# Patient Record
Sex: FEMALE | Employment: FULL TIME | URBAN - METROPOLITAN AREA
[De-identification: names, ages, dates, MRNs, and addresses within clinical notes are randomized per-mention and may not be internally consistent; named-entity substitution may affect disease eponyms.]

---

## 2017-01-19 ENCOUNTER — OFFICE VISIT (OUTPATIENT)
Dept: BEHAVIORAL HEALTH | Facility: PHYSICIAN GROUP | Age: 32
End: 2017-01-19
Payer: COMMERCIAL

## 2017-01-19 VITALS
BODY MASS INDEX: 35.36 KG/M2 | SYSTOLIC BLOOD PRESSURE: 118 MMHG | WEIGHT: 220 LBS | HEIGHT: 66 IN | HEART RATE: 76 BPM | DIASTOLIC BLOOD PRESSURE: 70 MMHG | RESPIRATION RATE: 16 BRPM | TEMPERATURE: 97.7 F

## 2017-01-19 DIAGNOSIS — F40.10 SOCIAL ANXIETY DISORDER: ICD-10-CM

## 2017-01-19 DIAGNOSIS — F33.0 MILD EPISODE OF RECURRENT MAJOR DEPRESSIVE DISORDER (HCC): ICD-10-CM

## 2017-01-19 PROCEDURE — 90833 PSYTX W PT W E/M 30 MIN: CPT | Performed by: PSYCHIATRY & NEUROLOGY

## 2017-01-19 PROCEDURE — 99213 OFFICE O/P EST LOW 20 MIN: CPT | Performed by: PSYCHIATRY & NEUROLOGY

## 2017-01-19 RX ORDER — BUSPIRONE HYDROCHLORIDE 10 MG/1
10 TABLET ORAL 2 TIMES DAILY
Qty: 60 TAB | Refills: 5 | Status: SHIPPED | OUTPATIENT
Start: 2017-01-19 | End: 2018-03-17

## 2017-01-19 RX ORDER — BUPROPION HYDROCHLORIDE 300 MG/1
300 TABLET ORAL EVERY MORNING
Qty: 30 TAB | Refills: 5 | Status: SHIPPED | OUTPATIENT
Start: 2017-01-19 | End: 2018-03-17

## 2017-01-19 NOTE — PROGRESS NOTES
"[facility]  PSYCHIATRIC FOLLOW-UP NOTE    Name: Fela Stokes  MRN: [id number]  : 1985  Age: 31 y.o.  Date of assessment: 2017  PCP: DWIGHT Mata  Persons in attendance: Patient    REASON FOR VISIT/CHIEF COMPLAINT (as stated by Patient):  Fela Stokes is a 31 y.o., Unknown female, attending follow-up appointment for   Chief Complaint   Patient presents with   • Anxiety     The patient is seen today for depression and anxiety. The patient is anxious and not sleeping well.   .      HISTORY OF PRESENT ILLNESS:    This is a 30 yo female, mother of three children, employed, full time student, history of depression, anxiety, and fear of darkness. The patient is on bupropion  mg in AM and buspar 5 mg twice a day. The patient reported that she is anxious all the time and has been taking xanax 0.25 mg and at night for sleep.The patient is getting her xanax from PCP.     PSYCHOSOCIAL CHANGES SINCE PREVIOUS CONTACT:  Anxious and insomnia.    RESPONSE TO TREATMENT:  fair    MEDICATION SIDE EFFECTS:  Denied.    REVIEW OF SYSTEMS:        Constitutional positive - obesity   Eyes negative   Ears/Nose/Mouth/Throat negative   Cardiovascular negative   Respiratory negative   Gastrointestinal negative   Genitourinary negative   Muscular negative   Integumentary negative   Neurological negative   Endocrine positive - adrenal disorder.   Hematologic/Lymphatic positive - clotting disorder       PSYCHIATRIC EXAMINATION/MENTAL STATUS  /70 mmHg  Pulse 76  Temp(Src) 36.5 °C (97.7 °F)  Resp 16  Ht 1.676 m (5' 5.98\")  Wt 99.791 kg (220 lb)  BMI 35.53 kg/m2  Participation: Active verbal participation, Attentive and Engaged  Grooming:Casual  Orientation: Alert and Fully Oriented  Eye contact: Good  Behavior:Calm   Mood: Anxious  Affect: Flexible and Full range  Thought process: Logical and Goal-directed  Thought content:  Within normal limits  Speech: Rate within normal " limits  Perception:  Within normal limits  Memory:  No gross evidence of memory deficits  Insight: Good  Judgment: Good  Family/couple interaction observations:   Other:    Current risk:    Suicide: Low   Homicide: Low   Self-harm: Low  Relapse: Low  Other:   Crisis Safety Plan reviewed?Yes  If evidence of imminent risk is present, intervention/plan:    Medical Records/Labs/Diagnostic Tests Reviewed: yes.    Medical Records/Labs/Diagnostic Tests Ordered: none.    DIAGNOSTIC IMPRESSION(S):  1. Mild episode of recurrent major depressive disorder (CMS-HCC)    2. Social anxiety disorder           ASSESSMENT AND PLAN:    Major depression improved  Anxiety and insomnia    Increase Buspirone 10 mg twice a day # 60 Refills five  Bupropion  mg one AM # 30 refills five  Use xanax as needed and use OTC benafryl or melatonin for sleep.  Follow up in two months.      More than 50% of face-to-face time in this 30 minute visit was spent in psychotherapy/counseling.    Topics addressed include:    The patient is finishing her school in few months and she is working full time and takes care of her children.  Use good sleep hygiene  Meditation and other relaxation method  Avoid stimulant.    Keagan Diaz M.D.

## 2017-03-03 ENCOUNTER — TELEPHONE (OUTPATIENT)
Dept: MEDICAL GROUP | Facility: PHYSICIAN GROUP | Age: 32
End: 2017-03-03

## 2017-03-03 DIAGNOSIS — J02.0 STREP PHARYNGITIS: ICD-10-CM

## 2017-03-03 RX ORDER — AMOXICILLIN 875 MG/1
875 TABLET, COATED ORAL 2 TIMES DAILY
Qty: 20 TAB | Refills: 0 | Status: SHIPPED | OUTPATIENT
Start: 2017-03-03 | End: 2018-03-17

## 2017-03-03 NOTE — TELEPHONE ENCOUNTER
1. Caller Name: Fela Stokes                                           Call Back Number: [phone] (home)         Patient approves a detailed voicemail message: N\A    Pt states her son,Misha, was seen at  and tested positive for strep.  She is in [place] and has had a sore throat x 4 days.  Today she can barely talk.  She is asking if she can get anything called in to pharmacy in [place].  Thank you

## 2017-03-07 ENCOUNTER — OFFICE VISIT (OUTPATIENT)
Dept: URGENT CARE | Facility: PHYSICIAN GROUP | Age: 32
End: 2017-03-07
Payer: COMMERCIAL

## 2017-03-07 VITALS
SYSTOLIC BLOOD PRESSURE: 128 MMHG | WEIGHT: 209.6 LBS | DIASTOLIC BLOOD PRESSURE: 82 MMHG | TEMPERATURE: 98.5 F | OXYGEN SATURATION: 98 % | RESPIRATION RATE: 16 BRPM | BODY MASS INDEX: 33.85 KG/M2 | HEART RATE: 89 BPM

## 2017-03-07 DIAGNOSIS — J02.9 PHARYNGITIS, UNSPECIFIED ETIOLOGY: ICD-10-CM

## 2017-03-07 DIAGNOSIS — J04.0 LARYNGITIS: ICD-10-CM

## 2017-03-07 DIAGNOSIS — R05.9 COUGH: ICD-10-CM

## 2017-03-07 DIAGNOSIS — J06.9 ACUTE URI: ICD-10-CM

## 2017-03-07 LAB
INT CON NEG: NEGATIVE
INT CON POS: POSITIVE
S PYO AG THROAT QL: NORMAL

## 2017-03-07 PROCEDURE — 99214 OFFICE O/P EST MOD 30 MIN: CPT | Performed by: FAMILY MEDICINE

## 2017-03-07 PROCEDURE — 87880 STREP A ASSAY W/OPTIC: CPT | Performed by: FAMILY MEDICINE

## 2017-03-07 RX ORDER — DIPHENHYDRAMINE HCL 50 MG
50 CAPSULE ORAL EVERY 6 HOURS PRN
COMMUNITY
End: 2018-03-17

## 2017-03-07 RX ORDER — DIPHENHYDRAMINE HYDROCHLORIDE AND LIDOCAINE HYDROCHLORIDE AND ALUMINUM HYDROXIDE AND MAGNESIUM HYDRO
KIT
Qty: 240 ML | Refills: 0 | Status: SHIPPED | OUTPATIENT
Start: 2017-03-07 | End: 2018-03-17

## 2017-03-07 ASSESSMENT — ENCOUNTER SYMPTOMS
HEADACHES: 1
SENSORY CHANGE: 0
MYALGIAS: 1
EYE REDNESS: 0
EYE DISCHARGE: 0
FOCAL WEAKNESS: 0
WEIGHT LOSS: 0

## 2017-03-07 NOTE — Clinical Note
March 7, 2017         Patient: Fela Stokes   YOB: 1985   Date of Visit: 3/7/2017           To Whom it May Concern:    Fela Stokes was seen in my clinic on 3/7/2017.  Please excuse 3/7 through 3/9/2017.       Sincerely,           Corbin Christian M.D.  Electronically Signed

## 2017-03-07 NOTE — Clinical Note
March 7, 2017         Patient: Fela Stokes   YOB: 1985   Date of Visit: 3/7/2017           To Whom it May Concern:    Fela Stokes was seen in my clinic on 3/7/2017. Please excuse today.       Sincerely,           Corbin Christian M.D.  Electronically Signed

## 2017-03-07 NOTE — MR AVS SNAPSHOT
Fela Stokes   3/7/2017 3:30 PM   Office Visit   MRN: [id number]    Department:  [facility]   Dept Phone:  [phone]    Description:  Female : 1985   Provider:  Corbin Christian M.D.           Reason for Visit     Pharyngitis sore throat, throat feels like its on fire, x 3 days      Allergies as of 3/7/2017     Allergen Noted Reactions    Morphine Sulfate 2012   Hives, Itching, Swelling      You were diagnosed with     Acute URI   [668334]       Pharyngitis, unspecified etiology   [0920836]       Laryngitis   [539965]       Cough   [786.2.ICD-9-CM]         Vital Signs     Blood Pressure Pulse Temperature Respirations Weight Oxygen Saturation    128/82 mmHg 89 36.9 °C (98.5 °F) 16 95.074 kg (209 lb 9.6 oz) 98%    Smoking Status                   Never Smoker            Basic Information     Date Of Birth Sex Race Ethnicity Preferred Language    1985 Female Unknown Unknown English      Your appointments     Mar 20, 2017  9:00 AM   Follow Up Med Management with Keagan Diaz M.D.   [facility] [address] [place] [address])    [address] [place] [address]  [address] [place] [id number]   [phone]              Problem List              ICD-10-CM Priority Class Noted - Resolved    Depression F32.9   2014 - Present    Anxiety F41.9   2014 - Present      Health Maintenance        Date Due Completion Dates    PAP SMEAR 2018, 2012 (Prv Comp)    Override on 2012: Previously completed    IMM DTaP/Tdap/Td Vaccine (2 - Td) 3/24/2022 3/24/2012            Current Immunizations     Hepatitis B Vaccine Recombivax (Adol/Adult) 2015    Influenza TIV (IM) 10/6/2014    Influenza Vaccine Quad Inj (Preserved) 10/17/2016, 10/13/2015    MMR Vaccine  Incomplete    Tdap Vaccine 3/24/2012  3:00 PM,  Incomplete    Tuberculin Skin Test 2016, 2016, 2015, 2014, 2013 10:23 AM      Below and/or attached are the medications your provider expects you to take.  Review all of your home medications and newly ordered medications with your provider and/or pharmacist. Follow medication instructions as directed by your provider and/or pharmacist. Please keep your medication list with you and share with your provider. Update the information when medications are discontinued, doses are changed, or new medications (including over-the-counter products) are added; and carry medication information at all times in the event of emergency situations     Allergies:  MORPHINE SULFATE - Hives,Itching,Swelling               Medications  Valid as of: March 07, 2017 -  3:50 PM    Generic Name Brand Name Tablet Size Instructions for use    ALPRAZolam (Tab) XANAX 0.25 MG Take 1 Tab by mouth 2 times a day as needed for Sleep or Anxiety.        Amoxicillin (Tab) AMOXIL 875 MG Take 1 Tab by mouth 2 times a day.        BuPROPion HCl   Take  by mouth.        BuPROPion HCl (TABLET SR 24 HR) WELLBUTRIN  MG Take 1 Tab by mouth every morning.        BuPROPion HCl (TABLET SR 24 HR) WELLBUTRIN  MG Take 1 Tab by mouth every morning.        BusPIRone HCl   Take  by mouth.        BusPIRone HCl (Tab) BUSPAR 5 MG Take 1 Tab by mouth 2 Times a Day.        BusPIRone HCl (Tab) BUSPAR 10 MG Take 1 Tab by mouth 2 times a day.        DiphenhydrAMINE HCl (Cap) BENADRYL 50 MG Take 50 mg by mouth every 6 hours as needed.        DPH-Lido-AlHydr-MgHydr-Simeth (Suspension) MAGIC MOUTHWASH BLM  15-30ml gargle and spit every 3 hours as needed        Hydrocod Polst-Chlorphen Polst (Suspension Extended Release) TUSSIONEX 10-8 MG/5ML Take 5 mL by mouth every 12 hours.        Vilazodone HCl (Tab) Vilazodone HCl 20 MG Take 20 mg by mouth every day.        .                 Medicines prescribed today were sent to:     [id number] - [place] - [address] AT [facility] [address]    [address] [place] [place] [address]    Phone: [phone] Fax: [phone]    Open 24 Hours?: No     [facility] [place] [facility] [address]    [address] [facility] [address]    Phone: [phone] Fax: [phone]    Open 24 Hours?: No      Medication refill instructions:       If your prescription bottle indicates you have medication refills left, it is not necessary to call your provider’s office. Please contact your pharmacy and they will refill your medication.    If your prescription bottle indicates you do not have any refills left, you may request refills at any time through one of the following ways: The online [employer] system (except Urgent Care), by calling your provider’s office, or by asking your pharmacy to contact your provider’s office with a refill request. Medication refills are processed only during regular business hours and may not be available until the next business day. Your provider may request additional information or to have a follow-up visit with you prior to refilling your medication.   *Please Note: Medication refills are assigned a new Rx number when refilled electronically. Your pharmacy may indicate that no refills were authorized even though a new prescription for the same medication is available at the pharmacy. Please request the medicine by name with the pharmacy before contacting your provider for a refill.           Collective IP Access Code: Activation code not generated  Current Collective IP Status: Active

## 2017-03-07 NOTE — PROGRESS NOTES
Subjective:      Fela Stokes is a 31 y.o. female who presents with Pharyngitis            Pharyngitis   This is a new problem. Episode onset: 3 days, ST, hoarse voice, rhinorrhea. Productive cough without blood in sputum. +SOB. No wheeze. No PMH asthma. +PMH pneumonia treated as outpatient. OTC tylenol and benadryl without relief.  Associated symptoms include headaches.   Started amoxicillin 4 days ago without improvement .    Review of Systems   Constitutional: Positive for malaise/fatigue. Negative for weight loss.   Eyes: Negative for discharge and redness.   Musculoskeletal: Positive for myalgias. Negative for joint pain.   Skin: Negative for itching and rash.   Neurological: Positive for headaches. Negative for sensory change and focal weakness.   .  Medications, Allergies, and current problem list reviewed today in Epic         Objective:     /82 mmHg  Pulse 89  Temp(Src) 36.9 °C (98.5 °F)  Resp 16  Wt 95.074 kg (209 lb 9.6 oz)  SpO2 98%     Physical Exam   Constitutional: She appears well-developed and well-nourished. No distress.   HENT:   Head: Normocephalic and atraumatic.   Right Ear: External ear normal.   Left Ear: External ear normal.   Nasal congestion  Pharynx red without exudate  Hoarse voice   Eyes: Conjunctivae are normal.   Neck: Neck supple.   Cardiovascular: Normal rate, regular rhythm and normal heart sounds.    Pulmonary/Chest: Effort normal and breath sounds normal. She has no wheezes.   Lymphadenopathy:     She has no cervical adenopathy.   Neurological:   Speech is clear. Patient is appropriate and cooperative.     Skin: Skin is warm and dry. No rash noted.               Assessment/Plan:     Strep negative    1. Acute URI     2. Pharyngitis, unspecified etiology  DPH-Lido-AlHydr-MgHydr-Simeth (MAGIC MOUTHWASH BLM) Suspension    POCT Rapid Strep A   3. Laryngitis  DPH-Lido-AlHydr-MgHydr-Simeth (MAGIC MOUTHWASH BLM) Suspension   4. Cough  Hydrocod Polst-CPM Polst ER  (TUSSIONEX) 10-8 MG/5ML Suspension Extended Release     Stop amoxicillin  Differential diagnosis, natural history, supportive care, and indications for immediate follow-up discussed at length.

## 2017-09-29 ENCOUNTER — HOSPITAL ENCOUNTER (OUTPATIENT)
Dept: LAB | Facility: MEDICAL CENTER | Age: 32
End: 2017-09-29
Payer: COMMERCIAL

## 2017-09-29 LAB
BDY FAT % MEASURED: 34.3 %
BP DIAS: 64 MMHG
BP SYS: 116 MMHG
CHOLEST SERPL-MCNC: 110 MG/DL (ref 100–199)
DIABETES HTDIA: NO
EVENT NAME HTEVT: NORMAL
FASTING HTFAS: YES
GLUCOSE SERPL-MCNC: 76 MG/DL (ref 65–99)
HDLC SERPL-MCNC: 48 MG/DL
HYPERTENSION HTHYP: NO
LDLC SERPL CALC-MCNC: 51 MG/DL
SCREENING LOC CITY HTCIT: NORMAL
SCREENING LOC STATE HTSTA: NORMAL
SCREENING LOCATION HTLOC: NORMAL
SMOKING HTSMO: NO
SUBSCRIBER ID HTSID: NORMAL
TRIGL SERPL-MCNC: 55 MG/DL (ref 0–149)

## 2017-09-29 PROCEDURE — S5190 WELLNESS ASSESSMENT BY NONPH: HCPCS

## 2017-09-29 PROCEDURE — 36415 COLL VENOUS BLD VENIPUNCTURE: CPT

## 2017-09-29 PROCEDURE — 80061 LIPID PANEL: CPT

## 2017-09-29 PROCEDURE — 82947 ASSAY GLUCOSE BLOOD QUANT: CPT

## 2017-10-03 ENCOUNTER — IMMUNIZATION (OUTPATIENT)
Dept: OCCUPATIONAL MEDICINE | Facility: CLINIC | Age: 32
End: 2017-10-03

## 2017-10-03 DIAGNOSIS — Z23 NEED FOR VACCINATION: ICD-10-CM

## 2017-10-03 PROCEDURE — 90686 IIV4 VACC NO PRSV 0.5 ML IM: CPT | Performed by: PREVENTIVE MEDICINE

## 2017-12-05 ENCOUNTER — EH NON-PROVIDER (OUTPATIENT)
Dept: OCCUPATIONAL MEDICINE | Facility: CLINIC | Age: 32
End: 2017-12-05

## 2017-12-05 ENCOUNTER — EMPLOYEE HEALTH (OUTPATIENT)
Dept: OCCUPATIONAL MEDICINE | Facility: CLINIC | Age: 32
End: 2017-12-05

## 2017-12-05 DIAGNOSIS — Z29.89 NEED FOR ISOLATION: ICD-10-CM

## 2017-12-05 DIAGNOSIS — Z01.89 RESPIRATORY CLEARANCE EXAMINATION, ENCOUNTER FOR: ICD-10-CM

## 2017-12-05 PROCEDURE — 8916 PR CLEARANCE ONLY: Performed by: PREVENTIVE MEDICINE

## 2017-12-05 PROCEDURE — 94375 RESPIRATORY FLOW VOLUME LOOP: CPT | Performed by: PREVENTIVE MEDICINE

## 2018-03-17 ENCOUNTER — HOSPITAL ENCOUNTER (OUTPATIENT)
Facility: MEDICAL CENTER | Age: 33
End: 2018-03-17
Attending: NURSE PRACTITIONER
Payer: COMMERCIAL

## 2018-03-17 ENCOUNTER — OFFICE VISIT (OUTPATIENT)
Dept: URGENT CARE | Facility: PHYSICIAN GROUP | Age: 33
End: 2018-03-17
Payer: COMMERCIAL

## 2018-03-17 VITALS
BODY MASS INDEX: 26.68 KG/M2 | HEIGHT: 67 IN | SYSTOLIC BLOOD PRESSURE: 112 MMHG | TEMPERATURE: 97.5 F | HEART RATE: 71 BPM | WEIGHT: 170 LBS | RESPIRATION RATE: 12 BRPM | DIASTOLIC BLOOD PRESSURE: 62 MMHG | OXYGEN SATURATION: 98 %

## 2018-03-17 DIAGNOSIS — J02.9 ACUTE PHARYNGITIS, UNSPECIFIED ETIOLOGY: ICD-10-CM

## 2018-03-17 LAB
INT CON NEG: NEGATIVE
INT CON POS: POSITIVE
S PYO AG THROAT QL: NORMAL

## 2018-03-17 PROCEDURE — 87070 CULTURE OTHR SPECIMN AEROBIC: CPT

## 2018-03-17 PROCEDURE — 99214 OFFICE O/P EST MOD 30 MIN: CPT | Performed by: NURSE PRACTITIONER

## 2018-03-17 PROCEDURE — 87880 STREP A ASSAY W/OPTIC: CPT | Performed by: NURSE PRACTITIONER

## 2018-03-17 NOTE — PROGRESS NOTES
Chief Complaint   Patient presents with   • Sore Throat     Onset last sunday       HISTORY OF PRESENT ILLNESS: Patient is a 32 y.o. female who presents today due to complaints of a sore throat for the past two days. Reports associated body aches, chills, pain with swallowing. Pain is currently rated as moderate. Denies associated congestion, cough, or difficulty breathing. They have tried OTC medications at home without much improvement. Denies known ill contacts.     Patient Active Problem List    Diagnosis Date Noted   • Depression 01/08/2014   • Anxiety 01/08/2014       Allergies:Morphine sulfate    Current Outpatient Prescriptions Ordered in [facility]   Medication Sig Dispense Refill   • DM-Benzocaine-Menthol (CHLORASEPTIC TOTAL MT) Spray  in mouth/throat.     • maalox plus-benadryl-visc lidocaine (MAGIC MOUTHWASH) Take 5 mL by mouth every 6 hours as needed. 100 mL 0     No current Epic-ordered facility-administered medications on file.        Past Medical History:   Diagnosis Date   • Anxiety    • Depression        Social History   Substance Use Topics   • Smoking status: Never Smoker   • Smokeless tobacco: Never Used   • Alcohol use No       Family Status   Relation Status   • Mother Alive   • Father Alive   • Maternal Aunt    • Maternal Grandmother    • Maternal Grandfather    • Paternal Grandmother    • Paternal Grandfather    • Neg Hx      Family History   Problem Relation Age of Onset   • Psychiatry Mother      depression   • Psychiatry Maternal Aunt      derpession   • Cancer Maternal Grandmother      breast cancer   • Cancer Maternal Grandfather      smoking lung cancer   • Cancer Paternal Grandmother    • Cancer Paternal Grandfather      lung cancer   • Diabetes Neg Hx    • Heart Attack Neg Hx    • Heart Disease Neg Hx    • Heart Failure Neg Hx        ROS:  Review of Systems   Constitutional: Positive for body aches, chills. Negative for weight loss, fever, and malaise/fatigue.   HENT: Positive for sore  "throat. Negative for ear pain, nosebleeds, congestion.   Eyes: Negative for vision changes.   Cardiovascular: Negative for chest pain, palpitations, orthopnea and leg swelling.   Respiratory: Negative for cough, sputum production, shortness of breath and wheezing.   Gastrointestinal: Negative for abdominal pain, nausea, vomiting or diarrhea.   Skin: Negative for rash, diaphoresis.     Exam:  Blood pressure 112/62, pulse 71, temperature 36.4 °C (97.5 °F), resp. rate 12, height 1.702 m (5' 7\"), weight 77.1 kg (170 lb), SpO2 98 %.  General: well-nourished, well-developed female in NAD  Head: normocephalic, atraumatic  Eyes: PERRLA, no conjunctival injection, acuity grossly intact, lids normal.  Ears: normal shape and symmetry, no tenderness, no discharge. External canals are without any significant edema or erythema. Tympanic membranes are without any inflammation, no effusion. Gross auditory acuity is intact.  Nose: symmetrical without tenderness, no discharge.  Mouth/Throat: reasonable hygiene. There is minimal erythema, without exudates or tonsillar enlargement present.  Neck: no masses, range of motion within normal limits, no tracheal deviation. No obvious thyroid enlargement.   Lymph: bilateral anterior cervical adenopathy. No supraclavicular adenopathy.   Neuro: alert and oriented. Cranial nerves 1-12 grossly intact. No sensory deficit.   Cardiovascular: regular rate and rhythm. No edema.  Pulmonary: no distress. Chest is symmetrical with respiration, no wheezes, crackles, or rhonchi.   Musculoskeletal: no clubbing, appropriate muscle tone, gait is stable.  Skin: warm, dry, intact, no clubbing, no cyanosis, no rashes.   Psych: appropriate mood, affect, judgement.         Assessment/Plan:  1. Acute pharyngitis, unspecified etiology  maalox plus-benadryl-visc lidocaine (MAGIC MOUTHWASH)    CULTURE THROAT    POCT Rapid Strep A         POC strep negative      Discussed symptoms most likely viral, self limiting " illness. Did not see any evidence of a bacterial process. Discussed natural progression and sx care. Culture obtained. MBX PRN.   OTC motrin or tylenol for pain/fever control. Hand hygiene. Increase fluid intake, rest. Warm salt water gargles.   Supportive care, differential diagnoses, and indications for immediate follow-up discussed with patient.   Pathogenesis of diagnosis discussed including typical length and natural progression.   Instructed to return to clinic or nearest emergency department for any change in condition, further concerns, or worsening of symptoms.  Patient states understanding of the plan of care and discharge instructions.  Instructed to make an appointment, for follow up, with their primary care provider.        Please note that this dictation was created using voice recognition software. I have made every reasonable attempt to correct obvious errors, but I expect that there are errors of grammar and possibly content that I did not discover before finalizing the note.      AVA Martins.

## 2018-03-18 DIAGNOSIS — J02.9 ACUTE PHARYNGITIS, UNSPECIFIED ETIOLOGY: ICD-10-CM

## 2018-03-20 ENCOUNTER — TELEPHONE (OUTPATIENT)
Dept: URGENT CARE | Facility: CLINIC | Age: 33
End: 2018-03-20

## 2018-03-20 LAB
BACTERIA SPEC RESP CULT: NORMAL
SIGNIFICANT IND 70042: NORMAL
SITE SITE: NORMAL
SOURCE SOURCE: NORMAL

## 2018-08-14 ENCOUNTER — DOCUMENTATION (OUTPATIENT)
Dept: OCCUPATIONAL MEDICINE | Facility: CLINIC | Age: 33
End: 2018-08-14

## 2018-09-20 ENCOUNTER — IMMUNIZATION (OUTPATIENT)
Dept: SOCIAL WORK | Facility: CLINIC | Age: 33
End: 2018-09-20

## 2018-09-20 DIAGNOSIS — Z23 NEED FOR VACCINATION: ICD-10-CM

## 2018-09-20 PROCEDURE — 90686 IIV4 VACC NO PRSV 0.5 ML IM: CPT | Performed by: REGISTERED NURSE

## 2018-09-25 ENCOUNTER — EH NON-PROVIDER (OUTPATIENT)
Dept: OCCUPATIONAL MEDICINE | Facility: CLINIC | Age: 33
End: 2018-09-25

## 2018-09-25 DIAGNOSIS — Z02.89 ENCOUNTER FOR OCCUPATIONAL HEALTH EXAMINATION INVOLVING RESPIRATOR: Primary | ICD-10-CM

## 2018-09-25 PROCEDURE — 94375 RESPIRATORY FLOW VOLUME LOOP: CPT | Performed by: PREVENTIVE MEDICINE

## 2018-10-12 ENCOUNTER — APPOINTMENT (OUTPATIENT)
Dept: SOCIAL WORK | Facility: CLINIC | Age: 33
End: 2018-10-12

## 2019-03-25 ENCOUNTER — OFFICE VISIT (OUTPATIENT)
Dept: URGENT CARE | Facility: PHYSICIAN GROUP | Age: 34
End: 2019-03-25
Payer: COMMERCIAL

## 2019-03-25 VITALS
OXYGEN SATURATION: 98 % | SYSTOLIC BLOOD PRESSURE: 106 MMHG | BODY MASS INDEX: 26.63 KG/M2 | TEMPERATURE: 98 F | DIASTOLIC BLOOD PRESSURE: 82 MMHG | RESPIRATION RATE: 16 BRPM | HEART RATE: 68 BPM | WEIGHT: 170 LBS

## 2019-03-25 DIAGNOSIS — H66.001 ACUTE SUPPURATIVE OTITIS MEDIA OF RIGHT EAR WITHOUT SPONTANEOUS RUPTURE OF TYMPANIC MEMBRANE, RECURRENCE NOT SPECIFIED: ICD-10-CM

## 2019-03-25 PROCEDURE — 99214 OFFICE O/P EST MOD 30 MIN: CPT | Performed by: FAMILY MEDICINE

## 2019-03-25 RX ORDER — AMOXICILLIN 500 MG/1
500 CAPSULE ORAL 3 TIMES DAILY
Qty: 30 CAP | Refills: 0 | Status: SHIPPED | OUTPATIENT
Start: 2019-03-25

## 2019-03-25 ASSESSMENT — ENCOUNTER SYMPTOMS
SORE THROAT: 0
DIARRHEA: 0
HEADACHES: 0
VOMITING: 0
FEVER: 0
ABDOMINAL PAIN: 0
COUGH: 1
NAUSEA: 0
SHORTNESS OF BREATH: 0

## 2019-03-25 NOTE — PROGRESS NOTES
"Subjective:     Fela Stokes is a 33 y.o. female who presents for Otalgia (right ear pain x 3 days,runy nose )    HPI  Pt presents for evaluation of a new problem   Pt with right sided ear pain for the past 3 days   Ear pain is constant, worsens at times throughout the day, and is overall worsening  Bothered her most at work last night  Also has associated rhinorrhea and mild cough   Has some decreased hearing acuity and feels \"like it needs to pop\"   No ear drainage  History of ear infection as an adult, but last infection more than 2 years ago    Review of Systems   Constitutional: Negative for fever.   HENT: Positive for congestion, ear pain and hearing loss. Negative for ear discharge and sore throat.    Respiratory: Positive for cough. Negative for shortness of breath.    Cardiovascular: Negative for chest pain.   Gastrointestinal: Negative for abdominal pain, diarrhea, nausea and vomiting.   Skin: Negative for rash.   Neurological: Negative for headaches.       PMH:  has a past medical history of Anxiety and Depression. She also has no past medical history of Addisons disease ([facility]); Adrenal disorder ([facility]); CHF (congestive heart failure) ([facility]); or Clotting disorder ([facility]).  MEDS:   Current Outpatient Prescriptions:   •  DM-Benzocaine-Menthol (CHLORASEPTIC TOTAL MT), Spray  in mouth/throat., Disp: , Rfl:   •  maalox plus-benadryl-visc lidocaine (MAGIC MOUTHWASH), Take 5 mL by mouth every 6 hours as needed. (Patient not taking: Reported on 3/25/2019), Disp: 100 mL, Rfl: 0  ALLERGIES:   Allergies   Allergen Reactions   • Morphine Sulfate Hives, Itching and Swelling     SURGHX:   Past Surgical History:   Procedure Laterality Date   • REPEAT C SECTION W TUBAL LIGATION  3/22/2012    Performed by YANET MOONEY at [facility]   • APPENDECTOMY     • PB C-SEC ONLY,PBEV C-SEC       SOCHX:  reports that she has never smoked. She has never used smokeless tobacco. She reports that she does not drink alcohol " or use drugs.  FH: Family history was reviewed, not contributing to acute illness     Objective:   /82 (BP Location: Right arm, Patient Position: Sitting, BP Cuff Size: Adult)   Pulse 68   Temp 36.7 °C (98 °F)   Resp 16   Wt 77.1 kg (170 lb)   SpO2 98%   BMI 26.63 kg/m²     Physical Exam   Constitutional: She appears well-developed and well-nourished. No distress.   HENT:   Head: Normocephalic and atraumatic.   Right Ear: External ear normal.   Left Ear: External ear normal.   Nose: Mucosal edema and rhinorrhea present.   Mouth/Throat: Uvula is midline, oropharynx is clear and moist and mucous membranes are normal.   Right tympanic membrane mildly erythematous with small purulent effusion, no perforation appreciated  Left tympanic membrane within normal limits   Eyes: Conjunctivae and EOM are normal. Right eye exhibits no discharge. Left eye exhibits no discharge. No scleral icterus.   Neck: Normal range of motion. No tracheal deviation present.   Cardiovascular: Normal rate and regular rhythm.    Pulmonary/Chest: Effort normal and breath sounds normal. No respiratory distress. She has no wheezes. She has no rales.   Neurological: She is alert. Coordination normal.   Skin: Skin is warm and dry. No rash noted. She is not diaphoretic.   Psychiatric: She has a normal mood and affect. Her behavior is normal. Judgment and thought content normal.       Assessment/Plan:   Assessment    1. Acute suppurative otitis media of right ear without spontaneous rupture of tympanic membrane, recurrence not specified  Patient is a 33-year-old female with right-sided otitis media.  Will treat with amoxicillin.  Reviewed other supportive care measures and advised using over-the-counter Afrin for the next 2 days to help relieve pressure while antibiotics are starting to work.  Patient will follow-up on an as-needed basis.  - amoxicillin (AMOXIL) 500 MG Cap; Take 1 Cap by mouth 3 times a day.  Dispense: 30 Cap; Refill:  0